# Patient Record
Sex: FEMALE | Race: WHITE
[De-identification: names, ages, dates, MRNs, and addresses within clinical notes are randomized per-mention and may not be internally consistent; named-entity substitution may affect disease eponyms.]

---

## 2018-06-19 ENCOUNTER — HOSPITAL ENCOUNTER (OUTPATIENT)
Dept: HOSPITAL 92 - SCSRAD | Age: 48
Discharge: HOME | End: 2018-06-19
Attending: NURSE PRACTITIONER
Payer: COMMERCIAL

## 2018-06-19 DIAGNOSIS — M25.511: Primary | ICD-10-CM

## 2018-06-19 DIAGNOSIS — M19.011: ICD-10-CM

## 2018-06-19 NOTE — RAD
THREE VIEWS OF THE RIGHT SHOULDER:

6/19/18

 

COMPARISON:  

None.

 

HISTORY: 

Constant dull pain, no history of injury.

 

FINDINGS:  

there is mild degenerative change at the right AC joint with osteophyte formation and interspace narr
owing. No widening of the coracoclavicular interspace. No displaced fracture or dislocation. 

 

IMPRESSION:  

Mild right AC joint degenerative change. No acute osseous abnormality. 

 

POS: [facility]